# Patient Record
Sex: FEMALE | URBAN - METROPOLITAN AREA
[De-identification: names, ages, dates, MRNs, and addresses within clinical notes are randomized per-mention and may not be internally consistent; named-entity substitution may affect disease eponyms.]

---

## 2024-02-07 ENCOUNTER — OV CON (OUTPATIENT)
Dept: URBAN - METROPOLITAN AREA CLINIC 113 | Facility: CLINIC | Age: 64
End: 2024-02-07
Payer: COMMERCIAL

## 2024-02-07 VITALS
BODY MASS INDEX: 30.9 KG/M2 | HEIGHT: 64 IN | WEIGHT: 181 LBS | TEMPERATURE: 97.8 F | RESPIRATION RATE: 14 BRPM | SYSTOLIC BLOOD PRESSURE: 106 MMHG | HEART RATE: 83 BPM | DIASTOLIC BLOOD PRESSURE: 78 MMHG

## 2024-02-07 DIAGNOSIS — R19.4 CHANGE IN BOWEL HABITS: ICD-10-CM

## 2024-02-07 DIAGNOSIS — K52.9 ACUTE AND CHRONIC COLITIS: ICD-10-CM

## 2024-02-07 DIAGNOSIS — R10.13 EPIGASTRIC PAIN: ICD-10-CM

## 2024-02-07 DIAGNOSIS — K62.5 RECTAL BLEEDING: ICD-10-CM

## 2024-02-07 PROBLEM — 111583006: Status: ACTIVE | Noted: 2024-02-07

## 2024-02-07 PROCEDURE — 99244 OFF/OP CNSLTJ NEW/EST MOD 40: CPT

## 2024-02-07 PROCEDURE — 99204 OFFICE O/P NEW MOD 45 MIN: CPT

## 2024-02-07 RX ORDER — QUETIAPINE 100 MG/1
1 TABLET AT BEDTIME TABLET, FILM COATED ORAL ONCE A DAY
Status: ACTIVE | COMMUNITY

## 2024-02-07 RX ORDER — HYDROXYUREA 500 MG/1
1 CAPSULE CAPSULE ORAL ONCE A DAY
Status: ACTIVE | COMMUNITY

## 2024-02-07 RX ORDER — LISINOPRIL 10 MG/1
1 TABLET TABLET ORAL ONCE A DAY
Status: ACTIVE | COMMUNITY

## 2024-02-07 RX ORDER — VITAMIN E (DL,TOCOPHERYL ACET) 180 MG
AS DIRECTED CAPSULE ORAL
Status: ACTIVE | COMMUNITY

## 2024-02-07 RX ORDER — PANTOPRAZOLE SODIUM 40 MG/1
1 TABLET TABLET, DELAYED RELEASE ORAL ONCE A DAY
Status: ACTIVE | COMMUNITY

## 2024-02-07 RX ORDER — SIMVASTATIN 40 MG
1 TABLET IN THE EVENING TABLET ORAL ONCE A DAY
Status: ACTIVE | COMMUNITY

## 2024-02-07 RX ORDER — MONTELUKAST 10 MG/1
AS DIRECTED TABLET, FILM COATED ORAL
Status: ACTIVE | COMMUNITY

## 2024-02-07 NOTE — HPI-TODAY'S VISIT:
63-year-old female presents for follow-up after ER evaluation for abdominal pain and rectal bleeding.  She has been referred by Jonathan Paul Kiff, PA-C.  A copy of today's visit will be forwarded to the referring provider. She was started on Protonix for her epigastric pain which appears to be helping. Labs 11/20/2023:Normal TSH, unremarkable lipid panel, hemoglobin A1c of 6, normal LFTs, normal creatinine, glucose 113, normal H/H, normal WBC, normal platelet count. H. pylori breath test negative on 12/27/2023. She was seen in the ED at [facility] on 12/23-23 with complaints of nausea and vomiting.  She reported a history of "gallbladder issues" for which she has not followed up with surgery.  Labs revealed marked leukocytosis of 21.5, hemoglobin 15.9, medic at 45.9, normal MCV, glucose 139, BUN 31, normal LFTs, lipase of 12.  Abdominal ultrasound showed possible minor sludge in the gallbladder with no cholecystitis or biliary obstruction.  CT scan of the abdomen/pelvis with contrast revealed moderate colitis, transverse and descending colon.  There was distal colonic diverticulosis without diverticulitis.  There was also incidental findings to include hysterectomy, chronic large ventral fat and bowel containing hernia.  She was treated and discharged on Levaquin.  She ate a deer meat burger the week of her symptom onset and baked ziti the day of her symptom onset. She awoke at midnight with n/v/d and epigastric pain. She has had near resolution of her symptoms following her course of abx. She admits she started Mounjaro 2-3 weeks prior to symptom onset as well. She has discontinued following her ER visit. Her bowels are more regular as well. Prior to symptom onset, she was severely constipation predominant. She would go weeks without a bowel movement. SHe is now taking MiraLAX intermittently. She has had a ventral hernia since her hysterectomy. SHe also attributes this to straining.   Denies n/v/d, abdominal pain, or blood per rectum. She had a normal screening colonoscopy in June 2023 with Dr. Kelly.

## 2024-02-08 LAB
A/G RATIO: 2.1
ABSOLUTE BASOPHILS: 77
ABSOLUTE EOSINOPHILS: 264
ABSOLUTE LYMPHOCYTES: 2865
ABSOLUTE MONOCYTES: 578
ABSOLUTE NEUTROPHILS: 4718
ALBUMIN: 4.4
ALKALINE PHOSPHATASE: 79
ALT (SGPT): 10
AST (SGOT): 15
BASOPHILS: 0.9
BILIRUBIN, TOTAL: 0.4
BUN/CREATININE RATIO: (no result)
BUN: 21
C-REACTIVE PROTEIN, QUANT: 4.9
CALCIUM: 10
CARBON DIOXIDE, TOTAL: 23
CHLORIDE: 105
CREATININE: 0.83
EGFR: 78
EOSINOPHILS: 3.1
GLOBULIN, TOTAL: 2.1
GLUCOSE: 97
HEMATOCRIT: 43.5
HEMOGLOBIN: 15
LYMPHOCYTES: 33.7
MCH: 31.5
MCHC: 34.5
MCV: 91.4
MONOCYTES: 6.8
MPV: 12.1
NEUTROPHILS: 55.5
PLATELET COUNT: 243
POTASSIUM: 4
PROTEIN, TOTAL: 6.5
RDW: 12.4
RED BLOOD CELL COUNT: 4.76
SODIUM: 142
WHITE BLOOD CELL COUNT: 8.5

## 2024-03-12 ENCOUNTER — LAB (OUTPATIENT)
Dept: URBAN - METROPOLITAN AREA CLINIC 4 | Facility: CLINIC | Age: 64
End: 2024-03-12
Payer: COMMERCIAL

## 2024-03-12 ENCOUNTER — COLON (OUTPATIENT)
Dept: URBAN - METROPOLITAN AREA SURGERY CENTER 25 | Facility: SURGERY CENTER | Age: 64
End: 2024-03-12
Payer: COMMERCIAL

## 2024-03-12 DIAGNOSIS — R93.3 ABNORMAL FINDINGS ON DIAGNOSTIC IMAGING OF OTHER PARTS OF DIGESTIVE TRACT: ICD-10-CM

## 2024-03-12 DIAGNOSIS — K63.89 OTHER SPECIFIED DISEASES OF INTESTINE: ICD-10-CM

## 2024-03-12 DIAGNOSIS — K62.5 RECTAL BLEEDING: ICD-10-CM

## 2024-03-12 PROCEDURE — 88305 TISSUE EXAM BY PATHOLOGIST: CPT | Performed by: PATHOLOGY

## 2024-03-12 PROCEDURE — 45380 COLONOSCOPY AND BIOPSY: CPT | Performed by: INTERNAL MEDICINE

## 2024-03-12 RX ORDER — HYDROXYUREA 500 MG/1
1 CAPSULE CAPSULE ORAL ONCE A DAY
Status: ACTIVE | COMMUNITY

## 2024-03-12 RX ORDER — MONTELUKAST 10 MG/1
AS DIRECTED TABLET, FILM COATED ORAL
Status: ACTIVE | COMMUNITY

## 2024-03-12 RX ORDER — VITAMIN E (DL,TOCOPHERYL ACET) 180 MG
AS DIRECTED CAPSULE ORAL
Status: ACTIVE | COMMUNITY

## 2024-03-12 RX ORDER — PANTOPRAZOLE SODIUM 40 MG/1
1 TABLET TABLET, DELAYED RELEASE ORAL ONCE A DAY
Status: ACTIVE | COMMUNITY

## 2024-03-12 RX ORDER — POLYETHYLENE GLYCOL 3350, SODIUM CHLORIDE, SODIUM BICARBONATE, POTASSIUM CHLORIDE 420; 11.2; 5.72; 1.48 G/4L; G/4L; G/4L; G/4L
AS DIRECTED POWDER, FOR SOLUTION ORAL ONCE
Qty: 420 GM | Refills: 0 | Status: ACTIVE | COMMUNITY
Start: 2024-02-26 | End: 2024-03-27

## 2024-03-12 RX ORDER — LISINOPRIL 10 MG/1
1 TABLET TABLET ORAL ONCE A DAY
Status: ACTIVE | COMMUNITY

## 2024-03-12 RX ORDER — SIMVASTATIN 40 MG
1 TABLET IN THE EVENING TABLET ORAL ONCE A DAY
Status: ACTIVE | COMMUNITY

## 2024-03-12 RX ORDER — QUETIAPINE 100 MG/1
1 TABLET AT BEDTIME TABLET, FILM COATED ORAL ONCE A DAY
Status: ACTIVE | COMMUNITY

## 2024-04-02 ENCOUNTER — OV EP (OUTPATIENT)
Dept: URBAN - METROPOLITAN AREA CLINIC 113 | Facility: CLINIC | Age: 64
End: 2024-04-02
Payer: COMMERCIAL

## 2024-04-02 VITALS
RESPIRATION RATE: 14 BRPM | DIASTOLIC BLOOD PRESSURE: 88 MMHG | SYSTOLIC BLOOD PRESSURE: 123 MMHG | TEMPERATURE: 97.3 F | BODY MASS INDEX: 32.44 KG/M2 | WEIGHT: 190 LBS | HEART RATE: 72 BPM | HEIGHT: 64 IN

## 2024-04-02 DIAGNOSIS — K52.89 OTHER SPECIFIED NONINFECTIVE GASTROENTERITIS AND COLITIS: ICD-10-CM

## 2024-04-02 DIAGNOSIS — D72.829 LEUKOCYTOSIS, UNSPECIFIED TYPE: ICD-10-CM

## 2024-04-02 DIAGNOSIS — R19.4 CHANGE IN BOWEL HABITS: ICD-10-CM

## 2024-04-02 DIAGNOSIS — R10.13 EPIGASTRIC PAIN: ICD-10-CM

## 2024-04-02 PROBLEM — 397881000: Status: ACTIVE | Noted: 2024-04-02

## 2024-04-02 PROCEDURE — 99212 OFFICE O/P EST SF 10 MIN: CPT

## 2024-04-02 RX ORDER — QUETIAPINE 100 MG/1
1 TABLET AT BEDTIME TABLET, FILM COATED ORAL ONCE A DAY
Status: ACTIVE | COMMUNITY

## 2024-04-02 RX ORDER — SIMVASTATIN 40 MG
1 TABLET IN THE EVENING TABLET ORAL ONCE A DAY
Status: ACTIVE | COMMUNITY

## 2024-04-02 RX ORDER — HYDROXYUREA 500 MG/1
1 CAPSULE CAPSULE ORAL ONCE A DAY
Status: ACTIVE | COMMUNITY

## 2024-04-02 RX ORDER — MONTELUKAST 10 MG/1
AS DIRECTED TABLET, FILM COATED ORAL
Status: ACTIVE | COMMUNITY

## 2024-04-02 RX ORDER — PANTOPRAZOLE SODIUM 40 MG/1
1 TABLET TABLET, DELAYED RELEASE ORAL ONCE A DAY
Status: ACTIVE | COMMUNITY

## 2024-04-02 RX ORDER — LISINOPRIL 10 MG/1
1 TABLET TABLET ORAL ONCE A DAY
Status: ACTIVE | COMMUNITY

## 2024-04-02 RX ORDER — VITAMIN E (DL,TOCOPHERYL ACET) 180 MG
AS DIRECTED CAPSULE ORAL
Status: ACTIVE | COMMUNITY

## 2024-04-02 NOTE — HPI-TODAY'S VISIT:
66 months for follow-up after colonoscopy.  She was last seen on 2/7/2024. Labs 2/7/2024: Normal CRP, normal CMP, normal CBC. Colonoscopy 3/12/2024: BBPS score of 9.  TI was normal.  Grade 2 nonbleeding internal hemorrhoids.  Diverticulosis in the sigmoid colon.  A segmental area of mildly erythematous mucosa was found in the sigmoid colon and biopsied.  Pathology revealed no abnormality.  Repeat in 10 years for screening purposes.  She did have recurrent lower abdominal pain recently. She attributed this to her constipation. She took 2 Dulcolax tablets and a dose of Miralax and cleansed her bowels. THis resolved her pain. She is otherwise defecating every other day. Denies nausea, vomiting or abdominal pain. No blood per rectum or melena. Denies fevers or chills. She does still have indigestion but admits she is not compliant with daily Protonix. She has trouble remembering to take medications in the morning time.  2/7/2024: 63-year-old female presents for follow-up after ER evaluation for abdominal pain and rectal bleeding.  She has been referred by Jonathan Paul Kiff, PA-C.  A copy of today's visit will be forwarded to the referring provider. She was started on Protonix for her epigastric pain which appears to be helping. Labs 11/20/2023:Normal TSH, unremarkable lipid panel, hemoglobin A1c of 6, normal LFTs, normal creatinine, glucose 113, normal H/H, normal WBC, normal platelet count. H. pylori breath test negative on 12/27/2023. She was seen in the ED at [facility] on 12/23-23 with complaints of nausea and vomiting.  She reported a history of "gallbladder issues" for which she has not followed up with surgery.  Labs revealed marked leukocytosis of 21.5, hemoglobin 15.9, medic at 45.9, normal MCV, glucose 139, BUN 31, normal LFTs, lipase of 12.  Abdominal ultrasound showed possible minor sludge in the gallbladder with no cholecystitis or biliary obstruction.  CT scan of the abdomen/pelvis with contrast revealed moderate colitis, transverse and descending colon.  There was distal colonic diverticulosis without diverticulitis.  There was also incidental findings to include hysterectomy, chronic large ventral fat and bowel containing hernia.  She was treated and discharged on Levaquin.  She ate a deer meat burger the week of her symptom onset and baked ziti the day of her symptom onset. She awoke at midnight with n/v/d and epigastric pain. She has had near resolution of her symptoms following her course of abx. She admits she started Mounjaro 2-3 weeks prior to symptom onset as well. She has discontinued following her ER visit. Her bowels are more regular as well. Prior to symptom onset, she was severely constipation predominant. She would go weeks without a bowel movement. SHe is now taking MiraLAX intermittently. She has had a ventral hernia since her hysterectomy. SHe also attributes this to straining.   Denies n/v/d, abdominal pain, or blood per rectum. She had a normal screening colonoscopy in June 2023 with Dr. Kelly.